# Patient Record
(demographics unavailable — no encounter records)

---

## 2024-10-17 NOTE — DISCUSSION/SUMMARY
[Normal Growth] : growth [Normal Development] : development [None] : No known medical problems [No Elimination Concerns] : elimination [No Feeding Concerns] : feeding [No Skin Concerns] : skin [Normal Sleep Pattern] : sleep [Family Support] : family support [Encouraging Literacy Activities] : encouraging literacy activities [Playing with Peers] : playing with peers [Promoting Physical Activity] : promoting physical activity [Safety] : safety [No Medications] : ~He/She~ is not on any medications [Parent/Guardian] : parent/guardian [] : The components of the vaccine(s) to be administered today are listed in the plan of care. The disease(s) for which the vaccine(s) are intended to prevent and the risks have been discussed with the caretaker.  The risks are also included in the appropriate vaccination information statements which have been provided to the patient's caregiver.  The caregiver has given consent to vaccinate. [FreeTextEntry1] : Discussed and/or provided information on the following: FAMILY SUPPORT: Family decisions; sibling rivalry; work balance LITERACY: Singing, talking, describing, observing, reading PEERS: Interactive games; play opportunities PHYSICAL ACTIVITY: Limits on inactivity SAFETY: Car seats; pedestrian safety; falls from windows; guns DIET ADVICE: Eating a balanced diet, iron absorption, avoiding excessive sweets and junk food PHYSICAL ACTIVITY AND SPORTS WAS DISCUSSED

## 2024-10-17 NOTE — HISTORY OF PRESENT ILLNESS
[Father] : father [Normal] : Normal [Yes] : Patient goes to dentist yearly [Appropiate parent-child communication] : Appropriate parent-child communication [Parent has appropriate responses to behavior] : Parent has appropriate responses to behavior [No] : No cigarette smoke exposure [Water heater temperature set at <120 degrees F] : Water heater temperature set at <120 degrees F [Car seat in back seat] : Car seat in back seat [Smoke Detectors] : Smoke detectors [Supervised play near cars and streets] : Supervised play near cars and streets [Carbon Monoxide Detectors] : Carbon monoxide detectors [Up to date] : Up to date [NO] : No

## 2024-10-17 NOTE — PHYSICAL EXAM

## 2024-10-17 NOTE — DEVELOPMENTAL MILESTONES
